# Patient Record
Sex: MALE | Race: WHITE | NOT HISPANIC OR LATINO | Employment: OTHER | URBAN - METROPOLITAN AREA
[De-identification: names, ages, dates, MRNs, and addresses within clinical notes are randomized per-mention and may not be internally consistent; named-entity substitution may affect disease eponyms.]

---

## 2017-01-13 RX ORDER — LISINOPRIL 5 MG/1
TABLET ORAL
Qty: 30 TABLET | Refills: 2 | Status: SHIPPED | OUTPATIENT
Start: 2017-01-13 | End: 2017-04-08 | Stop reason: SDUPTHER

## 2017-01-13 RX ORDER — FENOFIBRATE 145 MG/1
TABLET ORAL
Qty: 30 TABLET | Refills: 2 | Status: SHIPPED | OUTPATIENT
Start: 2017-01-13 | End: 2017-04-08 | Stop reason: SDUPTHER

## 2017-01-19 ENCOUNTER — HOSPITAL ENCOUNTER (OUTPATIENT)
Dept: SLEEP MEDICINE | Facility: HOSPITAL | Age: 32
Discharge: HOME | End: 2017-01-19
Admitting: INTERNAL MEDICINE

## 2017-01-19 PROCEDURE — 99213 OFFICE O/P EST LOW 20 MIN: CPT | Performed by: INTERNAL MEDICINE

## 2017-01-19 PROCEDURE — G0463 HOSPITAL OUTPT CLINIC VISIT: HCPCS

## 2017-01-29 PROBLEM — G47.33 OSA ON CPAP: Status: ACTIVE | Noted: 2017-01-29

## 2017-01-29 PROBLEM — Z99.89 OSA ON CPAP: Status: ACTIVE | Noted: 2017-01-29

## 2017-01-29 NOTE — PROGRESS NOTES
[facility] Note of January 19, 2017      Patient Care Team:  Ruth Bello MD as PCP - General  Ruth Bello MD as PCP - Family Medicine    Chief Complaint:  KWABENA     Interval History:   Patient was last seen by me in March 2015.  He states he is better when he uses his auto CPAP.  He gets better sleep.  However, he has some increasing complaints of gas distention.  He goes to bed at 10 PM.  He wakes up 4 times during the nighttime.  Henefer Sleepiness Scale is abnormal at 12.    Review of Systems:  Recorded on the Sleep Questionnaire.  Unremarkable except for abdominal bloating.    Social History:  He continues to smoke cigarettes.  He has 3 sodas a day.    Allergies:  Reviewed.     Medication Review:  Reviewed.      Vital Signs:  Height 72 inches and his weight is down 10 pounds and presently is 238 is still obese with a body mass index of 32.  /80 heart rate 73  17.5 inches.    Physical Exam:    Constitutional:  Well developed white male and appears in no apparent distress.  Awake & oriented times 3.  Normal mood with normal recent and remote memory and normal judgement.  Eyes:  Conjunctivae normal.  Oropharynx:  moist mucous membranes without exudate and a large tongue with scalloped margins and a normal size uvula that is slightly deviated to the right and patent posterior pharyngeal opening and class II MP airway.      Results Review:  DME is naps and he uses nasal pillows.  Downloads between October 21, 2016 and January 18, 2017 reveals compliance at 40%.  Average usage is 5 hours and 21 minutes.  Average AHI is normal with no leak.  Average auto CPAP pressure is 9.9 the patient's auto CPAP is set on 8-20 centimeters water pressure.       Impression:   Obstructive sleep apnea that is adequately treated with auto titrating CPAP with poor compliance and good usage.  He does complain of abdominal bloating when he uses his auto CPAP.  However, he states he also has been  her sleep is improved when he uses it.      Plan:  Good sleep hygiene measures should be maintained further weight loss would be beneficial.  Due to the abdominal bloating, I will change his auto CPAP to 8-14 centimeters water pressure.  I again stressed the importance of using it nightly and using it greater than 4 hours.  I will see him back in 4-6 months.      Hamzah Sunshine MD  01/29/17  8:05 AM

## 2017-02-13 RX ORDER — PROPRANOLOL HYDROCHLORIDE 160 MG/1
CAPSULE, EXTENDED RELEASE ORAL
Qty: 60 CAPSULE | Refills: 1 | Status: SHIPPED | OUTPATIENT
Start: 2017-02-13 | End: 2017-04-08 | Stop reason: SDUPTHER

## 2017-02-13 RX ORDER — LEVETIRACETAM 1000 MG/1
TABLET ORAL
Qty: 60 TABLET | Refills: 1 | Status: SHIPPED | OUTPATIENT
Start: 2017-02-13 | End: 2017-04-08 | Stop reason: SDUPTHER

## 2017-04-10 RX ORDER — PROPRANOLOL HYDROCHLORIDE 160 MG/1
CAPSULE, EXTENDED RELEASE ORAL
Qty: 60 CAPSULE | Refills: 2 | Status: SHIPPED | OUTPATIENT
Start: 2017-04-10 | End: 2017-06-09 | Stop reason: SDUPTHER

## 2017-04-10 RX ORDER — LEVETIRACETAM 1000 MG/1
TABLET ORAL
Qty: 60 TABLET | Refills: 2 | Status: SHIPPED | OUTPATIENT
Start: 2017-04-10 | End: 2017-06-09 | Stop reason: SDUPTHER

## 2017-04-10 RX ORDER — LISINOPRIL 5 MG/1
TABLET ORAL
Qty: 30 TABLET | Refills: 2 | Status: SHIPPED | OUTPATIENT
Start: 2017-04-10 | End: 2017-06-09 | Stop reason: SDUPTHER

## 2017-04-10 RX ORDER — FENOFIBRATE 145 MG/1
TABLET ORAL
Qty: 30 TABLET | Refills: 2 | Status: SHIPPED | OUTPATIENT
Start: 2017-04-10 | End: 2017-06-09 | Stop reason: SDUPTHER

## 2017-05-17 ENCOUNTER — HOSPITAL ENCOUNTER (OUTPATIENT)
Dept: SLEEP MEDICINE | Facility: HOSPITAL | Age: 32
Discharge: HOME | End: 2017-05-17
Admitting: INTERNAL MEDICINE

## 2017-05-17 PROCEDURE — G0463 HOSPITAL OUTPT CLINIC VISIT: HCPCS

## 2017-05-17 PROCEDURE — 99213 OFFICE O/P EST LOW 20 MIN: CPT | Performed by: INTERNAL MEDICINE

## 2017-06-09 RX ORDER — LISINOPRIL 5 MG/1
TABLET ORAL
Qty: 30 TABLET | Refills: 1 | Status: SHIPPED | OUTPATIENT
Start: 2017-06-09 | End: 2017-08-04 | Stop reason: SDUPTHER

## 2017-06-09 RX ORDER — LEVETIRACETAM 1000 MG/1
TABLET ORAL
Qty: 60 TABLET | Refills: 1 | Status: SHIPPED | OUTPATIENT
Start: 2017-06-09 | End: 2017-08-04 | Stop reason: SDUPTHER

## 2017-06-09 RX ORDER — PROPRANOLOL HYDROCHLORIDE 160 MG/1
CAPSULE, EXTENDED RELEASE ORAL
Qty: 60 CAPSULE | Refills: 1 | Status: SHIPPED | OUTPATIENT
Start: 2017-06-09 | End: 2017-08-04 | Stop reason: SDUPTHER

## 2017-06-09 RX ORDER — FENOFIBRATE 145 MG/1
TABLET ORAL
Qty: 30 TABLET | Refills: 1 | Status: SHIPPED | OUTPATIENT
Start: 2017-06-09 | End: 2017-08-04 | Stop reason: SDUPTHER

## 2017-08-04 RX ORDER — LEVETIRACETAM 1000 MG/1
TABLET ORAL
Qty: 60 TABLET | Refills: 3 | Status: SHIPPED | OUTPATIENT
Start: 2017-08-04 | End: 2017-11-21 | Stop reason: SDUPTHER

## 2017-08-04 RX ORDER — PROPRANOLOL HYDROCHLORIDE 160 MG/1
CAPSULE, EXTENDED RELEASE ORAL
Qty: 60 CAPSULE | Refills: 3 | Status: SHIPPED | OUTPATIENT
Start: 2017-08-04 | End: 2017-11-21 | Stop reason: SDUPTHER

## 2017-08-04 RX ORDER — FENOFIBRATE 145 MG/1
TABLET ORAL
Qty: 30 TABLET | Refills: 3 | Status: SHIPPED | OUTPATIENT
Start: 2017-08-04 | End: 2017-11-21 | Stop reason: SDUPTHER

## 2017-08-04 RX ORDER — LISINOPRIL 5 MG/1
TABLET ORAL
Qty: 30 TABLET | Refills: 3 | Status: SHIPPED | OUTPATIENT
Start: 2017-08-04 | End: 2017-11-21 | Stop reason: SDUPTHER

## 2017-11-22 RX ORDER — LEVETIRACETAM 1000 MG/1
TABLET ORAL
Qty: 60 TABLET | Refills: 6 | Status: SHIPPED | OUTPATIENT
Start: 2017-11-22

## 2017-11-22 RX ORDER — LISINOPRIL 5 MG/1
TABLET ORAL
Qty: 30 TABLET | Refills: 6 | Status: SHIPPED | OUTPATIENT
Start: 2017-11-22

## 2017-11-22 RX ORDER — FENOFIBRATE 145 MG/1
TABLET, COATED ORAL
Qty: 30 TABLET | Refills: 6 | Status: SHIPPED | OUTPATIENT
Start: 2017-11-22 | End: 2018-03-04

## 2017-11-22 RX ORDER — PROPRANOLOL HYDROCHLORIDE 160 MG/1
CAPSULE, EXTENDED RELEASE ORAL
Qty: 60 CAPSULE | Refills: 6 | Status: SHIPPED | OUTPATIENT
Start: 2017-11-22

## 2018-05-16 ENCOUNTER — DOCUMENTATION (OUTPATIENT)
Dept: SLEEP MEDICINE | Facility: HOSPITAL | Age: 33
End: 2018-05-16

## 2018-05-16 ENCOUNTER — OFFICE VISIT (OUTPATIENT)
Dept: SLEEP MEDICINE | Facility: HOSPITAL | Age: 33
End: 2018-05-16
Attending: INTERNAL MEDICINE

## 2018-05-16 DIAGNOSIS — Z99.89 OSA ON CPAP: Primary | ICD-10-CM

## 2018-05-16 DIAGNOSIS — G47.14 HYPERSOMNIA DUE TO MEDICAL CONDITION: ICD-10-CM

## 2018-05-16 DIAGNOSIS — G47.33 OSA ON CPAP: Primary | ICD-10-CM

## 2018-05-16 PROCEDURE — G0463 HOSPITAL OUTPT CLINIC VISIT: HCPCS

## 2018-05-16 PROCEDURE — 99213 OFFICE O/P EST LOW 20 MIN: CPT | Performed by: INTERNAL MEDICINE

## 2018-05-16 NOTE — PROGRESS NOTES
Patient seen in sleep clinic today, per Dr. Sunshine pressure change from auto setting 8-14 to new setting of 8-12 was completed via encore and pt has modem. Updated settings will download following morning when machine calls in.

## 2018-05-16 NOTE — PROGRESS NOTES
[facility] Note     Chief Complaint:  KWABENA     Interval History:   The patient was last seen by me in May 2017.  He states he is unchanged.  However, he has not used his CPAP in over 6 months.  He goes to bed at 11 PM and awakens at 3 AM.  His Plummer Sleepiness Scale is abnormal at 11.    Review of Systems:  Recorded on the Sleep Questionnaire.  Unremarkable except for nasal congestion and postnasal drip, atypical chest pain and wheezing, MALENA and abdominal bloating, and anxiety.    Social History:  No caffeine  Social History     Social History   • Marital status: Single     Social History Main Topics   • Smoking status: Former Smoker   • Alcohol use No      Comment: stopped drinking alcohol   • Drug use: Unknown     Other Topics Concern   • Not on file       Allergies:  Pertussis immune globulin; Pertussis vaccine; and Phenytoin     Medication Review:  Reviewed.      Vital Signs:  Height 72 inches, weight has gone from 222 pounds up to 240 pounds and BMI obese at 32-33.    Physical Exam:    Constitutional:  Well developed white male and appears in no apparent distress.  Awake & oriented times 3.  Normal mood with normal recent and remote memory and normal judgement.  Eyes:  Conjunctivae normal.  Oropharynx:  moist mucous membranes without exudate and a large tongue with scalloped margins and a normal sized uvula that is slightly deviated to the right and patent posterior pharyngeal opening and class II MP airway      Results Review:  No results available since the patient has not used his CPAP.     Impression:   History of mild obstructive sleep apnea by overnight polysomnogram March 9, 2015, weight 248 pounds.  Mild obstructive sleep apnea mainly when supine and during REM.  The patient's snore 54% of the time with a mild abnormal arousal index related to snoring at 5.7 events per hour.  CPAP recommended due to the patient's history of seizures.      Plan:  Good sleep hygiene measures should  be maintained.  Weight loss would be beneficial in this patient who is obese by BMI.  The patient is benefiting from the treatment being provided.     After reviewing all, I would decrease the patient's auto CPAP pressure to 8-12.  If the patient still does not tolerate CPAP, consider an oral appliance.    The patient will call for any problems and will follow up in 4-6 weeks.      Hamzah Sunshine MD  Sleep Medicine  05/20/18  8:35 AM

## 2018-05-20 PROBLEM — G47.14 HYPERSOMNIA DUE TO MEDICAL CONDITION: Status: ACTIVE | Noted: 2018-05-20

## 2018-06-27 ENCOUNTER — OFFICE VISIT (OUTPATIENT)
Dept: SLEEP MEDICINE | Facility: HOSPITAL | Age: 33
End: 2018-06-27
Attending: INTERNAL MEDICINE

## 2018-06-27 DIAGNOSIS — Z99.89 OSA ON CPAP: ICD-10-CM

## 2018-06-27 DIAGNOSIS — G47.33 OSA ON CPAP: ICD-10-CM

## 2018-06-27 DIAGNOSIS — G47.14 HYPERSOMNIA DUE TO MEDICAL CONDITION: Primary | ICD-10-CM

## 2018-06-27 PROCEDURE — 99213 OFFICE O/P EST LOW 20 MIN: CPT | Performed by: INTERNAL MEDICINE

## 2018-06-27 PROCEDURE — G0463 HOSPITAL OUTPT CLINIC VISIT: HCPCS

## 2018-06-27 NOTE — PROGRESS NOTES
[facility] Note     Chief Complaint:  KWABENA     Interval History:   The patient was last seen by me in May.  Changes suggested and made with his pressures to try to improve compliance.  He is here today with his mother and states he is unchanged.  He goes to bed at 9 PM and awakens at 6 AM.  He wakes up 4 times during the nighttime.  [place]st Sleepiness Scale is abnormal at 8.    Review of Systems:  Recorded on the Sleep Questionnaire.  Recorded and is positive for all except fever and chills     Social History:    Social History     Social History   • Marital status: Single     Social History Main Topics   • Smoking status: Former Smoker   • Alcohol use No      Comment: stopped drinking alcohol   • Drug use: Unknown     Other Topics Concern   • Not on file       Allergies:  Pertussis immune globulin; Pertussis vaccine; and Phenytoin     Medication Review:  Reviewed.      Vital Signs:  Height 72 inches, weight 230 pounds and BMI obese at 31-32.    Physical Exam:    Constitutional:  Well developed white male and appears in no apparent distress.  Awake & oriented times 3.  Normal mood with normal recent and remote memory and normal judgement.  Eyes:  Conjunctivae normal.  Oropharynx:  moist mucous membranes without exudate and a large tongue with scalloped margins and a normal sized uvula that is slightly deviated to the right and patent posterior pharyngeal opening and class II MP airway      Results Review:  DME is Naps and he uses a nasal interface.  Downloads between May 16 and June 26, 2018 compliance is less than 5%.  Average usage is 2 hours and 32 minutes.  Average AHI is normal without leak.  Average AutoCPAP pressure is 9.8 and his auto CPAP is 8-12.       Impression:   History of mild obstructive sleep apnea by overnight polysomnogram March 9, 2015, weight 248 pounds.  Mild obstructive sleep apnea mainly when supine and during REM.  The patient's snore 54% of the time with a mild abnormal  arousal index related to snoring at 5.7 events per hour.  CPAP recommended due to the patient's history of seizures.  However, the patient continues to be noncompliant with therapy due to its comfort.  The patient is not interested in an oral appliance.      Plan:  Good sleep hygiene measures should be maintained.  Weight loss would be beneficial in this patient who is obese by BMI.  The patient is benefiting from the treatment being provided.     The patient and his mother both described that he has a better interface?  He states he thinks he can use it better.    The patient will call for any problems and will follow up in 4 months.      Hamzah Sunshine MD  Sleep Medicine  06/27/18  12:24 PM

## 2018-07-31 ENCOUNTER — TELEPHONE (OUTPATIENT)
Dept: NEUROSURGERY | Facility: CLINIC | Age: 33
End: 2018-07-31

## 2018-07-31 DIAGNOSIS — Q04.6 COLLOID CYST OF BRAIN (HCC): Primary | ICD-10-CM

## 2018-10-01 ENCOUNTER — HOSPITAL ENCOUNTER (OUTPATIENT)
Dept: MRI IMAGING | Facility: HOSPITAL | Age: 33
Discharge: HOME | End: 2018-10-01
Admitting: NURSE PRACTITIONER

## 2018-10-01 DIAGNOSIS — Q04.6 COLLOID CYST OF BRAIN (HCC): ICD-10-CM

## 2018-10-01 PROCEDURE — 0 GADOBENATE DIMEGLUMINE 529 MG/ML SOLUTION: Performed by: NURSE PRACTITIONER

## 2018-10-01 PROCEDURE — A9577 INJ MULTIHANCE: HCPCS | Performed by: NURSE PRACTITIONER

## 2018-10-01 PROCEDURE — 70553 MRI BRAIN STEM W/O & W/DYE: CPT

## 2018-10-01 RX ADMIN — GADOBENATE DIMEGLUMINE 20 ML: 529 INJECTION, SOLUTION INTRAVENOUS at 09:47

## 2018-10-22 ENCOUNTER — OFFICE VISIT (OUTPATIENT)
Dept: NEUROSURGERY | Facility: CLINIC | Age: 33
End: 2018-10-22

## 2018-10-22 VITALS
SYSTOLIC BLOOD PRESSURE: 140 MMHG | WEIGHT: 244 LBS | HEIGHT: 72 IN | HEART RATE: 116 BPM | BODY MASS INDEX: 33.05 KG/M2 | RESPIRATION RATE: 16 BRPM | DIASTOLIC BLOOD PRESSURE: 88 MMHG

## 2018-10-22 DIAGNOSIS — Q04.6 CYST, COLLOID, THIRD VENTRICLE (HCC): Primary | ICD-10-CM

## 2018-10-22 PROBLEM — R00.0 TACHYCARDIA: Status: ACTIVE | Noted: 2018-10-22

## 2018-10-22 PROCEDURE — 99203 OFFICE O/P NEW LOW 30 MIN: CPT | Performed by: NURSE PRACTITIONER

## 2018-10-22 RX ORDER — GABAPENTIN 600 MG/1
TABLET ORAL
Refills: 5 | COMMUNITY
Start: 2018-10-03

## 2018-10-22 RX ORDER — FENOFIBRATE 145 MG/1
145 TABLET, COATED ORAL DAILY
COMMUNITY

## 2018-10-22 NOTE — PROGRESS NOTES
"Subjective   Patient ID: Jack Farrell is a 33 y.o. male is here today for follow-up colloid cyst. Patient presents accompanied by his mom/guardian.     History of Present Illness  Patient presents for ongoing follow-up of colloid cyst.  He is status post right frontal transcallosal approach craniotomy for excision of third ventricular cyst in July 2008.  He is been followed with serial imaging since that time.  His last visit was 5 years ago.  He has had repeat MRI of the brain since his last encounter. No major headaches. He continues to have episodes of seizures; last seizure was 2 months ago. They usually occur at night. He feels they may be precipitated by stress. No associated falls but there is tongue biting/ incontinence. He is followed by  neurology. He is not currently taking his seizure medications or his propanolol. He states it makes him \"feel like shit\".    The following portions of the patient's history were reviewed and updated as appropriate: allergies, current medications, past family history, past medical history, past social history, past surgical history and problem list.    Review of Systems   Constitutional: Negative for fever.   HENT: Negative for hearing loss and tinnitus.    Eyes: Negative for visual disturbance.   Respiratory: Positive for shortness of breath.    Cardiovascular: Negative for chest pain.   Gastrointestinal: Negative for nausea and vomiting.   Genitourinary: Positive for enuresis (with seizures).   Musculoskeletal: Negative for gait problem.   Skin: Positive for wound (left leg).   Neurological: Positive for seizures (last episode 2 months ago) and headaches (occ'l). Negative for dizziness, tremors, syncope, speech difficulty, weakness, light-headedness and numbness.   Psychiatric/Behavioral: Positive for confusion and decreased concentration.       Objective   Physical Exam   Constitutional: He is oriented to person, place, and time. He appears well-developed and " well-nourished.   Body mass index is 33.09 kg/m².     HENT:   Right frontal crani incision well healed   Eyes: Pupils are equal, round, and reactive to light.   Neck: Neck supple.   Cardiovascular: Regular rhythm and normal heart sounds.   No extrasystoles are present. Tachycardia present.    Pulmonary/Chest: Effort normal.   Neurological: He is alert and oriented to person, place, and time. He has normal strength. He has a normal Finger-Nose-Finger Test, a normal Heel to Shin Test, a normal Romberg Test and a normal Tandem Gait Test. Gait normal.   Skin: Skin is warm and dry.   Bruise right wrist and left anterior shin   Psychiatric: He has a normal mood and affect. His speech is normal and behavior is normal.   Vitals reviewed.    Neurologic Exam     Mental Status   Oriented to person, place, and time.   Follows 3 step commands.   Attention: normal. Concentration: normal.   Speech: speech is normal   Level of consciousness: alert  Knowledge: good.   Normal comprehension.     Cranial Nerves     CN II   Visual fields full to confrontation.     CN III, IV, VI   Pupils are equal, round, and reactive to light.  Nystagmus: bilateral   Nystagmus type: slow  Ophthalmoparesis: none    CN V   Facial sensation intact.     CN VII   Facial expression full, symmetric.     CN VIII   CN VIII normal.     CN IX, X   CN IX normal.   CN X normal.     CN XI   CN XI normal.     CN XII   CN XII normal.   Paranaud's     Motor Exam   Muscle bulk: normal  Right arm pronator drift: absent  Left arm pronator drift: absent    Strength   Strength 5/5 throughout.     Gait, Coordination, and Reflexes     Gait  Gait: normal    Coordination   Romberg: negative  Finger to nose coordination: normal  Heel to shin coordination: normal  Tandem walking coordination: normal    Reflexes   Right Grimaldo: absent  Left Grimaldo: absent      Assessment/Plan   Independent Review of Radiographic Studies:    MRI brain from [facility] dated October  1, 2018 was reviewed with Dr. Bethea.  This reveals no recurrent or residual colloid cyst.  No evidence of hydrocephalus or abnormal enhancement.    Medical Decision Making:    Patient presents for ongoing follow-up of colloid cyst.   He reports intermittent episodes seizures.  He is noncompliant with his medications.  He admits that he has not taking them currently.  He does not recall when he took them last.  He does have regular follow-ups with neurology.  He also reports that he has not been taking his Inderal because he thinks it gives him heartburn.    His exam is as noted above.  No focal neurologic deficits. Recent MRI demonstrates no evidence of residual recurrent lesion.  He has sinus tachycardia on exam.  He is in no distress.  We discussed the importance of taking his beta blocker.  He states he's worried about taking the dose that he has been on.  I told him to start with 1 tablet and monitor his heart rate.  If it remains below 90, he can stay on the lower dose until he sees a PCP.  However, he needs to reestablish with the family doctor to manage his medications.  I also told him is imperative that he take his seizure medication and follow up with his neurologist regarding his concerns about side effects.  He needs no formal follow-up in our office.  We will see him on a when necessary basis.    Plan: Return to office when necessary.    Jack was seen today for colloid cyst.    Diagnoses and all orders for this visit:    Cyst, colloid, third ventricle (CMS/HCC)      Return if symptoms worsen or fail to improve.

## 2018-10-25 ENCOUNTER — APPOINTMENT (OUTPATIENT)
Dept: SLEEP MEDICINE | Facility: HOSPITAL | Age: 33
End: 2018-10-25
Attending: INTERNAL MEDICINE

## 2019-01-02 ENCOUNTER — APPOINTMENT (OUTPATIENT)
Dept: SLEEP MEDICINE | Facility: HOSPITAL | Age: 34
End: 2019-01-02
Attending: INTERNAL MEDICINE

## 2019-07-03 ENCOUNTER — APPOINTMENT (OUTPATIENT)
Dept: SLEEP MEDICINE | Facility: HOSPITAL | Age: 34
End: 2019-07-03
Attending: INTERNAL MEDICINE

## 2019-09-26 ENCOUNTER — APPOINTMENT (OUTPATIENT)
Dept: SLEEP MEDICINE | Facility: HOSPITAL | Age: 34
End: 2019-09-26
Attending: INTERNAL MEDICINE

## 2020-03-25 ENCOUNTER — APPOINTMENT (OUTPATIENT)
Dept: SLEEP MEDICINE | Facility: HOSPITAL | Age: 35
End: 2020-03-25
Attending: INTERNAL MEDICINE

## 2020-06-25 ENCOUNTER — APPOINTMENT (OUTPATIENT)
Dept: SLEEP MEDICINE | Facility: HOSPITAL | Age: 35
End: 2020-06-25
Attending: INTERNAL MEDICINE

## 2022-09-09 NOTE — TELEPHONE ENCOUNTER
Old records scanned  
Patient due for 5Y recall colloid cyst October 2018. MRI brain +/- w/ 3D SPGR ordered.  Will send to [facility] for records. Letter mailed.   
No